# Patient Record
Sex: FEMALE | Race: WHITE | NOT HISPANIC OR LATINO | Employment: UNEMPLOYED | URBAN - METROPOLITAN AREA
[De-identification: names, ages, dates, MRNs, and addresses within clinical notes are randomized per-mention and may not be internally consistent; named-entity substitution may affect disease eponyms.]

---

## 2017-10-27 ENCOUNTER — OFFICE VISIT (OUTPATIENT)
Dept: INTERNAL MEDICINE | Facility: CLINIC | Age: 37
End: 2017-10-27

## 2017-10-27 VITALS
OXYGEN SATURATION: 95 % | RESPIRATION RATE: 16 BRPM | TEMPERATURE: 98.4 F | HEIGHT: 69 IN | HEART RATE: 81 BPM | WEIGHT: 181 LBS | BODY MASS INDEX: 26.81 KG/M2

## 2017-10-27 DIAGNOSIS — E55.9 VITAMIN D DEFICIENCY: ICD-10-CM

## 2017-10-27 DIAGNOSIS — D50.9 IRON DEFICIENCY ANEMIA, UNSPECIFIED IRON DEFICIENCY ANEMIA TYPE: ICD-10-CM

## 2017-10-27 DIAGNOSIS — Z85.850 HX OF THYROID CANCER: ICD-10-CM

## 2017-10-27 DIAGNOSIS — Z86.69 HX OF MIGRAINE HEADACHES: ICD-10-CM

## 2017-10-27 DIAGNOSIS — Z00.00 ENCOUNTER FOR PREVENTIVE HEALTH EXAMINATION: Primary | ICD-10-CM

## 2017-10-27 LAB
BASOPHILS # BLD AUTO: 0.02 10*3/MM3 (ref 0–0.2)
BASOPHILS NFR BLD AUTO: 0.4 % (ref 0–1.5)
EOSINOPHIL # BLD AUTO: 0.06 10*3/MM3 (ref 0–0.7)
EOSINOPHIL NFR BLD AUTO: 1.1 % (ref 0.3–6.2)
ERYTHROCYTE [DISTWIDTH] IN BLOOD BY AUTOMATED COUNT: 12.9 % (ref 11.7–13)
FERRITIN SERPL-MCNC: 33.69 NG/ML (ref 13–150)
HCT VFR BLD AUTO: 39.5 % (ref 35.6–45.5)
HGB BLD-MCNC: 12.7 G/DL (ref 11.9–15.5)
IMM GRANULOCYTES # BLD: 0 10*3/MM3 (ref 0–0.03)
IMM GRANULOCYTES NFR BLD: 0 % (ref 0–0.5)
IRON SATN MFR SERPL: 24 % (ref 20–50)
IRON SERPL-MCNC: 103 MCG/DL (ref 37–145)
LYMPHOCYTES # BLD AUTO: 1.04 10*3/MM3 (ref 0.9–4.8)
LYMPHOCYTES NFR BLD AUTO: 19.2 % (ref 19.6–45.3)
MCH RBC QN AUTO: 30 PG (ref 26.9–32)
MCHC RBC AUTO-ENTMCNC: 32.2 G/DL (ref 32.4–36.3)
MCV RBC AUTO: 93.4 FL (ref 80.5–98.2)
MONOCYTES # BLD AUTO: 0.35 10*3/MM3 (ref 0.2–1.2)
MONOCYTES NFR BLD AUTO: 6.4 % (ref 5–12)
NEUTROPHILS # BLD AUTO: 3.96 10*3/MM3 (ref 1.9–8.1)
NEUTROPHILS NFR BLD AUTO: 72.9 % (ref 42.7–76)
PLATELET # BLD AUTO: 283 10*3/MM3 (ref 140–500)
RBC # BLD AUTO: 4.23 10*6/MM3 (ref 3.9–5.2)
TIBC SERPL-MCNC: 427 MCG/DL
UIBC SERPL-MCNC: 324 MCG/DL
WBC # BLD AUTO: 5.43 10*3/MM3 (ref 4.5–10.7)

## 2017-10-27 PROCEDURE — 99395 PREV VISIT EST AGE 18-39: CPT | Performed by: INTERNAL MEDICINE

## 2017-10-27 PROCEDURE — 93000 ELECTROCARDIOGRAM COMPLETE: CPT | Performed by: INTERNAL MEDICINE

## 2017-10-27 RX ORDER — LEVOTHYROXINE SODIUM 0.2 MG/1
200 TABLET ORAL DAILY
COMMUNITY

## 2017-10-27 RX ORDER — LEVOTHYROXINE SODIUM 200 MCG
200 TABLET ORAL DAILY
COMMUNITY
Start: 2017-09-08 | End: 2017-10-27 | Stop reason: SDUPTHER

## 2017-10-27 RX ORDER — LEVOTHYROXINE SODIUM 0.2 MG/1
TABLET ORAL
COMMUNITY
Start: 2017-06-28 | End: 2017-10-27

## 2017-10-27 RX ORDER — ERGOCALCIFEROL 1.25 MG/1
1 CAPSULE ORAL 2 TIMES WEEKLY
COMMUNITY
Start: 2017-10-11

## 2017-10-27 NOTE — PROGRESS NOTES
Subjective   Iman London is a 36 y.o. female.     Chief Complaint   Patient presents with   • Annual Exam     new patient, chest pain, family hs of heart issues         HPI Comments: In for initial visit, transition of care, and annual preventative exam.  Sleep is poor but this is mainly related to school stress, home stress, and studying.  She is to exercise 3 days per week but not exercising now that she is in school.  Energy is poor but she thinks this is related to lack of sleep.  Diet is poor.       The following portions of the patient's history were reviewed and updated as appropriate: allergies, current medications, past social history and problem list.    HISTORY  Outpatient Prescriptions Marked as Taking for the 10/27/17 encounter (Office Visit) with Darwin Saunders MD   Medication Sig Dispense Refill   • Ferrous Sulfate ER (SLOW FE) 142 (45 Fe) MG tablet controlled-release Take 1 tablet by mouth Daily.     • levothyroxine (SYNTHROID, LEVOTHROID) 200 MCG tablet Take 200 mcg by mouth Daily. Take 1 tablet six times a week.     • vitamin D (ERGOCALCIFEROL) 38567 units capsule capsule Take 1 capsule by mouth 2 (Two) Times a Week.     • [DISCONTINUED] levothyroxine (SYNTHROID) 200 MCG tablet TAKE 1 TABLET DAILY MONDAY THROUGH FRIDAY, 1/2 TABLET ON SATURDAY ANDNO TABLET  ON SUNDAY     • [DISCONTINUED] SYNTHROID 200 MCG tablet Take 200 mcg by mouth Daily. Take 200mcg 6 times a week.       Social History     Social History   • Marital status: Other     Spouse name: N/A   • Number of children: N/A   • Years of education: N/A     Occupational History   • Not on file.     Social History Main Topics   • Smoking status: Never Smoker   • Smokeless tobacco: Never Used   • Alcohol use No   • Drug use: No   • Sexual activity: No     Other Topics Concern   • Not on file     Social History Narrative   • No narrative on file     Family History   Problem Relation Age of Onset   • Hypertension Father    • Diabetes Father     • Hyperlipidemia Father    • Diabetes Maternal Grandfather    • Melanoma Maternal Grandfather    • Pancreatic cancer Maternal Grandfather    • Hyperlipidemia Paternal Grandmother    • Heart attack Paternal Grandfather    • Hyperlipidemia Paternal Grandfather    • Hypertension Paternal Grandfather    • Alcohol abuse Maternal Uncle    • Asthma Paternal Uncle    • Diabetes Paternal Uncle    • Heart attack Paternal Uncle    • Heart failure Paternal Uncle      Past Medical History:   Diagnosis Date   • Anemia    • History of thyroid cancer      Past Surgical History:   Procedure Laterality Date   • APPENDECTOMY     • BLADDER SURGERY     •  SECTION     •  SECTION     •  SECTION     •  SECTION PRIMARY     • CHOLECYSTECTOMY     • DIAGNOSTIC LAPAROSCOPY  2001   • DILATATION AND CURETTAGE      miscariage   • THYROIDECTOMY     • TONSILLECTOMY     • WISDOM TOOTH EXTRACTION         Review of Systems   Constitutional: Negative for appetite change, chills, fatigue and fever.   HENT: Negative for congestion, ear pain, hearing loss, nosebleeds, postnasal drip, rhinorrhea, sinus pressure and trouble swallowing.    Eyes: Negative for pain, itching and visual disturbance.   Respiratory: Negative for cough, chest tightness, shortness of breath and wheezing.    Cardiovascular: Negative for chest pain, palpitations and leg swelling.   Gastrointestinal: Negative for abdominal pain, anal bleeding, constipation, diarrhea, nausea, rectal pain and vomiting.   Endocrine: Negative for cold intolerance, heat intolerance and polyuria.   Genitourinary: Negative for difficulty urinating, dysuria, flank pain, frequency, hematuria and urgency.   Musculoskeletal: Negative for arthralgias, back pain and myalgias.   Skin: Negative for rash.   Allergic/Immunologic: Negative for environmental allergies.   Neurological: Positive for headaches. Negative for dizziness, syncope,  speech difficulty, weakness, light-headedness and numbness.   Hematological: Does not bruise/bleed easily.   Psychiatric/Behavioral: Negative for agitation, confusion and sleep disturbance. The patient is nervous/anxious.        Objective   Vitals:    10/27/17 1104   Pulse: 81   Resp: 16   Temp: 98.4 °F (36.9 °C)   SpO2: 95%      Last Weight    10/27/17  1104   Weight: 181 lb (82.1 kg)    [unfilled]  Body mass index is 26.73 kg/(m^2).      Physical Exam   Constitutional: She is oriented to person, place, and time. She appears well-developed and well-nourished.   HENT:   Head: Normocephalic and atraumatic.   Right Ear: External ear normal.   Left Ear: External ear normal.   Nose: Nose normal.   Mouth/Throat: Oropharynx is clear and moist.   Eyes: Conjunctivae and EOM are normal. Pupils are equal, round, and reactive to light.   Neck: Normal range of motion. Neck supple. No JVD present. No thyromegaly present.   Cardiovascular: Normal rate, regular rhythm, normal heart sounds and intact distal pulses.  Exam reveals no gallop.    No murmur heard.  Pulmonary/Chest: Effort normal and breath sounds normal. No respiratory distress. She has no wheezes. She has no rales.   Abdominal: Soft. Bowel sounds are normal. She exhibits no distension and no mass. There is no tenderness. There is no guarding. No hernia.   Musculoskeletal: Normal range of motion. She exhibits no edema.   Lymphadenopathy:     She has no cervical adenopathy.   Neurological: She is alert and oriented to person, place, and time. She displays normal reflexes. No cranial nerve deficit. Coordination normal.   Skin: Skin is warm and dry.   Psychiatric: She has a normal mood and affect. Her behavior is normal. Judgment and thought content normal.   Nursing note and vitals reviewed.        Problem List Items Addressed This Visit        Digestive    Vitamin D deficiency       Hematopoietic and Hemostatic    Iron deficiency anemia    Relevant Medications     Ferrous Sulfate ER (SLOW FE) 142 (45 Fe) MG tablet controlled-release       Other    Hx of thyroid cancer    Hx of migraine headaches      Other Visit Diagnoses     Encounter for preventive health examination    -  Primary        Assessment/Plan   In for initial visit, transition of care, and annual preventative exam.  She has a very stressful life at present.  She's going through a divorce.  Has young children at home.  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  She has history of thyroid cancer is been resected.  She has a history of chronic iron deficiency anemia.  Only blood loss is heavy periods but she thinks this predated her heavy periods.  Mother has a history of iron deficiency anemia as well as grandmother.  She has severe microcytosis on the only labs available to me.  She has responded to iron replacement therapy in the past.  She gets about 3 headaches per week.  Throbbing headaches.  Associated nausea and vomiting.  Occasional photophobia and phonophobia.  The headaches began about 5 years ago.  There is no family history of migraines.  Also weather changes have been a precipitant or in the past.  No specific food precipitators.  She's had pretty classic scintillating scotomata in the past.  She's doing a lot of caffeine these days and that's likely a major contributor.      ECG 12 Lead  Date/Time: 10/27/2017 5:48 PM  Performed by: ROSEMARIE GALLEGOS  Authorized by: ROSEMARIE GALLEGOS   Comparison: not compared with previous ECG   Previous ECG: no previous ECG available  Rhythm: sinus rhythm  Rate: normal  Conduction: conduction normal  ST Segments: ST segments normal  T Waves: T waves normal  QRS axis: normal  Other: no other findings  Clinical impression: normal ECG                 Carl disclaimer:   Much of this encounter note is an electronic transcription/translation of spoken language to printed text. The electronic translation of spoken language may permit erroneous, or at times, nonsensical words or  phrases to be inadvertently transcribed; Although I have reviewed the note for such errors, some may still exist.

## 2017-11-29 ENCOUNTER — TELEPHONE (OUTPATIENT)
Dept: INTERNAL MEDICINE | Facility: CLINIC | Age: 37
End: 2017-11-29

## 2017-11-29 NOTE — TELEPHONE ENCOUNTER
Left message for patient to call back. 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

## 2017-12-01 ENCOUNTER — OFFICE VISIT (OUTPATIENT)
Dept: INTERNAL MEDICINE | Facility: CLINIC | Age: 37
End: 2017-12-01

## 2017-12-01 VITALS — HEIGHT: 69 IN | DIASTOLIC BLOOD PRESSURE: 62 MMHG | SYSTOLIC BLOOD PRESSURE: 116 MMHG

## 2017-12-01 DIAGNOSIS — Z01.30 BLOOD PRESSURE CHECK: Primary | ICD-10-CM

## 2018-02-19 ENCOUNTER — OFFICE VISIT (OUTPATIENT)
Dept: INTERNAL MEDICINE | Facility: CLINIC | Age: 38
End: 2018-02-19

## 2018-02-19 VITALS
HEIGHT: 69 IN | DIASTOLIC BLOOD PRESSURE: 72 MMHG | BODY MASS INDEX: 27.34 KG/M2 | TEMPERATURE: 98 F | WEIGHT: 184.6 LBS | SYSTOLIC BLOOD PRESSURE: 110 MMHG | RESPIRATION RATE: 16 BRPM | HEART RATE: 80 BPM | OXYGEN SATURATION: 98 %

## 2018-02-19 DIAGNOSIS — Z86.69 HX OF MIGRAINE HEADACHES: Primary | ICD-10-CM

## 2018-02-19 PROCEDURE — 99213 OFFICE O/P EST LOW 20 MIN: CPT | Performed by: INTERNAL MEDICINE

## 2018-02-19 RX ORDER — RIZATRIPTAN BENZOATE 5 MG/1
5 TABLET ORAL ONCE AS NEEDED
Qty: 6 TABLET | Refills: 1 | Status: SHIPPED | OUTPATIENT
Start: 2018-02-19

## 2018-02-19 RX ORDER — TOPIRAMATE 25 MG/1
25 TABLET ORAL NIGHTLY
Qty: 30 TABLET | Refills: 2 | Status: SHIPPED | OUTPATIENT
Start: 2018-02-19 | End: 2018-03-07 | Stop reason: SDUPTHER

## 2018-02-19 RX ORDER — TOPIRAMATE 25 MG/1
25 TABLET ORAL DAILY
Qty: 30 TABLET | Refills: 2 | Status: SHIPPED | OUTPATIENT
Start: 2018-02-19 | End: 2018-02-19

## 2018-02-19 NOTE — PROGRESS NOTES
Subjective   Iman London is a 37 y.o. female.     Chief Complaint   Patient presents with   • Headache     Limited caffeine use & still experiences headaches, worse w rain         Headache    This is a chronic problem. The current episode started more than 1 year ago. The problem occurs intermittently. The problem has been unchanged. The pain is located in the bilateral and frontal region. The pain quality is similar to prior headaches. The quality of the pain is described as throbbing. The pain is severe. Pertinent negatives include no dizziness, fever or weakness. The symptoms are aggravated by weather changes and menstrual cycle. She has tried nothing for the symptoms. Her past medical history is significant for migraine headaches and migraines in the family.        The following portions of the patient's history were reviewed and updated as appropriate: allergies, current medications, past social history and problem list.    Outpatient Prescriptions Marked as Taking for the 2/19/18 encounter (Office Visit) with Darwin Saunders MD   Medication Sig Dispense Refill   • Ferrous Sulfate ER (SLOW FE) 142 (45 Fe) MG tablet controlled-release Take 1 tablet by mouth Daily.     • levothyroxine (SYNTHROID, LEVOTHROID) 200 MCG tablet Take 200 mcg by mouth Daily. Take 1 tablet six times a week.     • vitamin D (ERGOCALCIFEROL) 08806 units capsule capsule Take 1 capsule by mouth 2 (Two) Times a Week.         Review of Systems   Constitutional: Negative for chills and fever.   Musculoskeletal: Negative for arthralgias.   Neurological: Positive for headaches. Negative for dizziness, syncope and weakness.       Objective   Vitals:    02/19/18 1118   BP: 110/72   Pulse: 80   Resp: 16   Temp: 98 °F (36.7 °C)   SpO2: 98%      Last Weight    02/19/18  1118   Weight: 83.7 kg (184 lb 9.6 oz)    [unfilled]  Body mass index is 27.25 kg/(m^2).      Physical Exam   Constitutional: She appears well-developed and well-nourished.    HENT:   Head: Normocephalic and atraumatic.   Right Ear: External ear normal.   Left Ear: External ear normal.   Nose: Nose normal.   Mouth/Throat: Oropharynx is clear and moist.   Eyes: Conjunctivae are normal. Pupils are equal, round, and reactive to light.   Pulmonary/Chest: Effort normal and breath sounds normal. No respiratory distress. She has no wheezes. She has no rales.   Skin: Skin is warm and dry.         Problem List Items Addressed This Visit        Other    Hx of migraine headaches - Primary        Assessment/Plan   In today with worsening migraines.  She cut out caffeine is that she could.  Just has 1 cup of coffee in the morning.  Nonetheless her migraines have been increasing lately.  Especially with the weather change.  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  We discussed some food interdiction's.  In on Topamax 25 mg at bedtime.  Begin Maxalt 5 mg when necessary.  #6.  We'll recheck in a few months and see how she's doing with her headaches.         Dragon disclaimer:   Much of this encounter note is an electronic transcription/translation of spoken language to printed text. The electronic translation of spoken language may permit erroneous, or at times, nonsensical words or phrases to be inadvertently transcribed; Although I have reviewed the note for such errors, some may still exist.

## 2018-02-26 ENCOUNTER — TELEPHONE (OUTPATIENT)
Dept: INTERNAL MEDICINE | Facility: CLINIC | Age: 38
End: 2018-02-26

## 2018-03-07 RX ORDER — TOPIRAMATE 25 MG/1
TABLET ORAL
Qty: 90 TABLET | Refills: 2 | Status: SHIPPED | OUTPATIENT
Start: 2018-03-07 | End: 2018-05-29 | Stop reason: SDUPTHER

## 2018-04-05 ENCOUNTER — TELEPHONE (OUTPATIENT)
Dept: INTERNAL MEDICINE | Facility: CLINIC | Age: 38
End: 2018-04-05

## 2018-04-05 NOTE — TELEPHONE ENCOUNTER
The patient is currently taking 75 mg of topamax daily. She has been taking this for about 3-4 weeks and said it worked great when she first started. Over the last week, it has seemed less effective. Patient is currently on her menstrual cycle and wondered if the hormonal changes could affect how the medication works? Should she adjust her dose? Please advise. Thanks!    In general migraines are worse around menses time so it may be her normal worsening.  I would give it a cycle or 2 and see if she is happy with her global control.  If not would boost up to 100 mg daily.

## 2018-04-26 ENCOUNTER — OFFICE VISIT (OUTPATIENT)
Dept: INTERNAL MEDICINE | Facility: CLINIC | Age: 38
End: 2018-04-26

## 2018-04-26 VITALS
OXYGEN SATURATION: 97 % | WEIGHT: 173.4 LBS | HEART RATE: 90 BPM | DIASTOLIC BLOOD PRESSURE: 76 MMHG | BODY MASS INDEX: 25.68 KG/M2 | RESPIRATION RATE: 16 BRPM | HEIGHT: 69 IN | TEMPERATURE: 98.4 F | SYSTOLIC BLOOD PRESSURE: 126 MMHG

## 2018-04-26 DIAGNOSIS — Z86.69 HX OF MIGRAINE HEADACHES: Primary | ICD-10-CM

## 2018-04-26 PROCEDURE — 99213 OFFICE O/P EST LOW 20 MIN: CPT | Performed by: INTERNAL MEDICINE

## 2018-04-26 NOTE — PROGRESS NOTES
Subjective   Iman London is a 37 y.o. female.     Chief Complaint   Patient presents with   • Migraine         Migraine    This is a chronic problem. The current episode started more than 1 year ago. The problem has been gradually improving. The pain is located in the bilateral region. The pain quality is similar to prior headaches. The quality of the pain is described as throbbing. The pain is moderate. Pertinent negatives include no dizziness, fever or weakness. She has tried NSAIDs for the symptoms. The treatment provided moderate relief.        The following portions of the patient's history were reviewed and updated as appropriate: allergies, current medications, past social history and problem list.    Outpatient Prescriptions Marked as Taking for the 4/26/18 encounter (Office Visit) with Darwin Saunders MD   Medication Sig Dispense Refill   • Ferrous Sulfate ER (SLOW FE) 142 (45 Fe) MG tablet controlled-release Take 1 tablet by mouth Daily.     • levothyroxine (SYNTHROID, LEVOTHROID) 200 MCG tablet Take 200 mcg by mouth Daily. Take 1 tablet six times a week.     • rizatriptan (MAXALT) 5 MG tablet Take 1 tablet by mouth 1 (One) Time As Needed for Migraine for up to 1 dose. May repeat in 2 hours if needed 6 tablet 1   • topiramate (TOPAMAX) 25 MG tablet Take 3 tablets nightly 90 tablet 2   • vitamin D (ERGOCALCIFEROL) 18021 units capsule capsule Take 1 capsule by mouth 2 (Two) Times a Week.         Review of Systems   Constitutional: Negative for chills and fever.   Musculoskeletal: Negative for arthralgias.   Neurological: Positive for headaches. Negative for dizziness, syncope and weakness.       Objective   Vitals:    04/26/18 1011   BP: 126/76   Pulse: 90   Resp: 16   Temp: 98.4 °F (36.9 °C)   SpO2: 97%      1    04/26/18  1011   Weight: 78.7 kg (173 lb 6.4 oz)    [unfilled]  Body mass index is 25.59 kg/m².      Physical Exam   Constitutional: She is oriented to person, place, and time. She appears  well-developed and well-nourished.   Neurological: She is alert and oriented to person, place, and time. She has normal reflexes. She displays normal reflexes. No cranial nerve deficit. Coordination normal.         Problem List Items Addressed This Visit        Other    Hx of migraine headaches - Primary      Other Visit Diagnoses    None.       Assessment/Plan   In today with worsening migraines.  On Topamax 75 mg at bedtime now.  It took a week or 2 to get used to it and that was rough but after that headaches firstly resolved.  She had a very nice response to therapy until finals came along and now headaches or exacerbating.  4-5 headaches in the last week or 2.  I suspect this is related to stress as well as change in sleep patterns.  Advised patient to keep sleep patterns is regular she can.  Current headaches seem to be around menses time.  She is taking some occasional Advil which helps the acute headaches.         Dragon disclaimer:   Much of this encounter note is an electronic transcription/translation of spoken language to printed text. The electronic translation of spoken language may permit erroneous, or at times, nonsensical words or phrases to be inadvertently transcribed; Although I have reviewed the note for such errors, some may still exist.

## 2018-05-09 ENCOUNTER — TELEPHONE (OUTPATIENT)
Dept: INTERNAL MEDICINE | Facility: CLINIC | Age: 38
End: 2018-05-09

## 2018-05-09 DIAGNOSIS — Z11.1 TUBERCULOSIS SCREENING: Primary | ICD-10-CM

## 2018-05-09 NOTE — TELEPHONE ENCOUNTER
1. Patient is wanting to know if she can up her dose of Topamax. She is current taking 75 mg at bedtime. Patient wakes up with a headache almost every morning and has had 3-4 migraines this week alone.      2. 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 I advised patient to go to the health department. Patient requested a written order from you for [facility] (she has done this in the past with another provider). Is this something you would order for her?     Please advise on both questions.     Yes.  Increase Topamax to 100 mg at bedtime.  Also yes, there is a pleasant test for TB screening.  It is either the T-spot or the quantiferon gold depending on the lab.  I would try to T spot with [employer].

## 2018-05-10 NOTE — TELEPHONE ENCOUNTER
Informed patient about increasing her Topamax. [facility] does not draw the Tspot. Ordered the Quantiferon gold. Patient will go to a [facility] draw station.

## 2018-05-17 LAB
ANNOTATION COMMENT IMP: NORMAL
GAMMA INTERFERON BACKGROUND BLD IA-ACNC: 0.05 IU/ML
M TB IFN-G BLD-IMP: NEGATIVE
M TB IFN-G CD4+ BCKGRND COR BLD-ACNC: 0 IU/ML
M TB IFN-G CD4+ T-CELLS BLD-ACNC: 0.05 IU/ML
MITOGEN IGNF BLD-ACNC: >10 IU/ML
QUANTIFERON INCUBATION: NORMAL
SERVICE CMNT-IMP: NORMAL

## 2018-05-29 RX ORDER — TOPIRAMATE 25 MG/1
TABLET ORAL
Qty: 90 TABLET | Refills: 2 | Status: SHIPPED | OUTPATIENT
Start: 2018-05-29 | End: 2018-05-30 | Stop reason: SDUPTHER

## 2018-05-29 NOTE — TELEPHONE ENCOUNTER
Medication, Strength, & Sig: Topamax 100 mg nightly  Last O.V.: 4/26/18  Next O.V.: 7/26/18  Verify Pharmacy: [facility] [place], [place]    *Dose increase (Ref: Telephone encounter 5/9/18) & pharmacy change.

## 2018-05-30 RX ORDER — TOPIRAMATE 25 MG/1
TABLET ORAL
Qty: 120 TABLET | Refills: 2 | Status: SHIPPED | OUTPATIENT
Start: 2018-05-30 | End: 2018-08-27 | Stop reason: SDUPTHER

## 2018-08-27 RX ORDER — TOPIRAMATE 25 MG/1
TABLET ORAL
Qty: 120 TABLET | Refills: 1 | Status: SHIPPED | OUTPATIENT
Start: 2018-08-27 | End: 2018-08-28 | Stop reason: SDUPTHER

## 2018-08-28 ENCOUNTER — OFFICE VISIT (OUTPATIENT)
Dept: INTERNAL MEDICINE | Facility: CLINIC | Age: 38
End: 2018-08-28

## 2018-08-28 VITALS
SYSTOLIC BLOOD PRESSURE: 116 MMHG | RESPIRATION RATE: 16 BRPM | HEART RATE: 84 BPM | OXYGEN SATURATION: 100 % | HEIGHT: 69 IN | WEIGHT: 155.6 LBS | BODY MASS INDEX: 23.05 KG/M2 | DIASTOLIC BLOOD PRESSURE: 64 MMHG

## 2018-08-28 DIAGNOSIS — Z86.69 HX OF MIGRAINE HEADACHES: Primary | ICD-10-CM

## 2018-08-28 DIAGNOSIS — D50.9 IRON DEFICIENCY ANEMIA, UNSPECIFIED IRON DEFICIENCY ANEMIA TYPE: ICD-10-CM

## 2018-08-28 PROCEDURE — 99213 OFFICE O/P EST LOW 20 MIN: CPT | Performed by: INTERNAL MEDICINE

## 2018-08-28 RX ORDER — TOPIRAMATE 100 MG/1
100 TABLET, FILM COATED ORAL NIGHTLY
Qty: 90 TABLET | Refills: 1 | Status: SHIPPED | OUTPATIENT
Start: 2018-08-28 | End: 2019-02-21 | Stop reason: SDUPTHER

## 2018-08-28 NOTE — PROGRESS NOTES
Subjective   Iman London is a 37 y.o. female.     Chief Complaint   Patient presents with   • Migraine     follow up         Mild iron deficiency anemia being followed on iron replacement.      Migraine    This is a chronic problem. The current episode started more than 1 year ago. The problem has been gradually improving. The pain is located in the bilateral region. The pain quality is similar to prior headaches. The quality of the pain is described as throbbing. The pain is moderate. Pertinent negatives include no dizziness, fever or weakness. She has tried NSAIDs for the symptoms. The treatment provided moderate relief.        The following portions of the patient's history were reviewed and updated as appropriate: allergies, current medications, past social history and problem list.    Outpatient Prescriptions Marked as Taking for the 8/28/18 encounter (Office Visit) with Darwin Saunders MD   Medication Sig Dispense Refill   • levothyroxine (SYNTHROID, LEVOTHROID) 200 MCG tablet Take 200 mcg by mouth Daily. Take 1 tablet six times a week.     • topiramate (TOPAMAX) 100 MG tablet Take 1 tablet by mouth Every Night. 90 tablet 1   • vitamin D (ERGOCALCIFEROL) 46995 units capsule capsule Take 1 capsule by mouth 2 (Two) Times a Week.     • [DISCONTINUED] topiramate (TOPAMAX) 25 MG tablet TAKE 4 TABLETS BY MOUTH EVERY DAY AT BEDTIME 120 tablet 1       Review of Systems   Constitutional: Negative for chills and fever.   Musculoskeletal: Negative for arthralgias.   Neurological: Positive for headaches. Negative for dizziness, syncope and weakness.       Objective   Vitals:    08/28/18 1357   BP: 116/64   Pulse: 84   Resp: 16   SpO2: 100%      1    08/28/18  1357   Weight: 70.6 kg (155 lb 9.6 oz)    [unfilled]  Body mass index is 22.97 kg/m².      Physical Exam   Constitutional: She is oriented to person, place, and time. She appears well-developed and well-nourished.   Neurological: She is alert and oriented to  person, place, and time. She has normal reflexes. She displays normal reflexes. No cranial nerve deficit. Coordination normal.         Problem List Items Addressed This Visit        Hematopoietic and Hemostatic    Iron deficiency anemia       Other    Hx of migraine headaches - Primary        Assessment/Plan   In today with worsening migraines.  On Topamax 75 mg at bedtime now.  It took a week or 2 to get used to it and that was rough but after that headaches firstly resolved.  She had a very nice response to therapy .  .  Few headaches over the summer.  Had a series of a few headaches around the start of school and that is now smoothed out.  I suspect this is related to stress as well as change in sleep patterns.  Advised patient to keep sleep patterns is regular she can.  Current headaches seem to be around menses time.  She is taking some occasional Advil which helps the acute headaches.  Will move out to 6 months on office visits at this point.  Her chronic iron deficiency is being treated with replacement therapy.  She's refused uterine ablations in the past.         Carl disclaimer:   Much of this encounter note is an electronic transcription/translation of spoken language to printed text. The electronic translation of spoken language may permit erroneous, or at times, nonsensical words or phrases to be inadvertently transcribed; Although I have reviewed the note for such errors, some may still exist.

## 2018-10-29 ENCOUNTER — OFFICE VISIT (OUTPATIENT)
Dept: INTERNAL MEDICINE | Facility: CLINIC | Age: 38
End: 2018-10-29

## 2018-10-29 VITALS
OXYGEN SATURATION: 99 % | WEIGHT: 151 LBS | RESPIRATION RATE: 16 BRPM | BODY MASS INDEX: 22.36 KG/M2 | SYSTOLIC BLOOD PRESSURE: 102 MMHG | HEART RATE: 64 BPM | HEIGHT: 69 IN | TEMPERATURE: 98 F | DIASTOLIC BLOOD PRESSURE: 60 MMHG

## 2018-10-29 DIAGNOSIS — D50.9 IRON DEFICIENCY ANEMIA, UNSPECIFIED IRON DEFICIENCY ANEMIA TYPE: ICD-10-CM

## 2018-10-29 DIAGNOSIS — Z85.850 HX OF THYROID CANCER: ICD-10-CM

## 2018-10-29 DIAGNOSIS — E55.9 VITAMIN D DEFICIENCY: ICD-10-CM

## 2018-10-29 DIAGNOSIS — Z00.00 ENCOUNTER FOR HEALTH MAINTENANCE EXAMINATION: Primary | ICD-10-CM

## 2018-10-29 LAB
25(OH)D3+25(OH)D2 SERPL-MCNC: 46.9 NG/ML (ref 30–100)
ALBUMIN SERPL-MCNC: 4.5 G/DL (ref 3.5–5.2)
ALBUMIN/GLOB SERPL: 1.7 G/DL
ALP SERPL-CCNC: 67 U/L (ref 39–117)
ALT SERPL-CCNC: 9 U/L (ref 1–33)
AST SERPL-CCNC: 11 U/L (ref 1–32)
BASOPHILS # BLD AUTO: 0.03 10*3/MM3 (ref 0–0.2)
BASOPHILS NFR BLD AUTO: 0.5 % (ref 0–1.5)
BILIRUB BLD-MCNC: NEGATIVE MG/DL
BILIRUB SERPL-MCNC: 0.3 MG/DL (ref 0.1–1.2)
BUN SERPL-MCNC: 8 MG/DL (ref 6–20)
BUN/CREAT SERPL: 9 (ref 7–25)
CALCIUM SERPL-MCNC: 9.3 MG/DL (ref 8.6–10.5)
CHLORIDE SERPL-SCNC: 108 MMOL/L (ref 98–107)
CHOLEST SERPL-MCNC: 138 MG/DL (ref 0–200)
CLARITY, POC: CLEAR
CO2 SERPL-SCNC: 21 MMOL/L (ref 22–29)
COLOR UR: YELLOW
CREAT SERPL-MCNC: 0.89 MG/DL (ref 0.57–1)
EOSINOPHIL # BLD AUTO: 0.06 10*3/MM3 (ref 0–0.7)
EOSINOPHIL NFR BLD AUTO: 1 % (ref 0.3–6.2)
ERYTHROCYTE [DISTWIDTH] IN BLOOD BY AUTOMATED COUNT: 14.6 % (ref 11.7–13)
GLOBULIN SER CALC-MCNC: 2.7 GM/DL
GLUCOSE SERPL-MCNC: 82 MG/DL (ref 65–99)
GLUCOSE UR STRIP-MCNC: NEGATIVE MG/DL
HCT VFR BLD AUTO: 33.6 % (ref 35.6–45.5)
HDLC SERPL-MCNC: 47 MG/DL (ref 40–60)
HGB BLD-MCNC: 10 G/DL (ref 11.9–15.5)
IMM GRANULOCYTES # BLD: 0.01 10*3/MM3 (ref 0–0.03)
IMM GRANULOCYTES NFR BLD: 0.2 % (ref 0–0.5)
KETONES UR QL: NEGATIVE
LDLC SERPL CALC-MCNC: 72 MG/DL (ref 0–100)
LEUKOCYTE EST, POC: NEGATIVE
LYMPHOCYTES # BLD AUTO: 1.44 10*3/MM3 (ref 0.9–4.8)
LYMPHOCYTES NFR BLD AUTO: 25.2 % (ref 19.6–45.3)
MCH RBC QN AUTO: 25.4 PG (ref 26.9–32)
MCHC RBC AUTO-ENTMCNC: 29.8 G/DL (ref 32.4–36.3)
MCV RBC AUTO: 85.3 FL (ref 80.5–98.2)
MONOCYTES # BLD AUTO: 0.29 10*3/MM3 (ref 0.2–1.2)
MONOCYTES NFR BLD AUTO: 5.1 % (ref 5–12)
NEUTROPHILS # BLD AUTO: 3.9 10*3/MM3 (ref 1.9–8.1)
NEUTROPHILS NFR BLD AUTO: 68.2 % (ref 42.7–76)
NITRITE UR-MCNC: NEGATIVE MG/ML
PH UR: 6 [PH] (ref 5–8)
PLATELET # BLD AUTO: 290 10*3/MM3 (ref 140–500)
POTASSIUM SERPL-SCNC: 4 MMOL/L (ref 3.5–5.2)
PROT SERPL-MCNC: 7.2 G/DL (ref 6–8.5)
PROT UR STRIP-MCNC: NEGATIVE MG/DL
RBC # BLD AUTO: 3.94 10*6/MM3 (ref 3.9–5.2)
RBC # UR STRIP: NEGATIVE /UL
SODIUM SERPL-SCNC: 141 MMOL/L (ref 136–145)
SP GR UR: 1.01 (ref 1–1.03)
TRIGL SERPL-MCNC: 93 MG/DL (ref 0–150)
TSH SERPL DL<=0.005 MIU/L-ACNC: 0.04 MIU/ML (ref 0.27–4.2)
UROBILINOGEN UR QL: NORMAL
VLDLC SERPL CALC-MCNC: 18.6 MG/DL (ref 5–40)
WBC # BLD AUTO: 5.72 10*3/MM3 (ref 4.5–10.7)

## 2018-10-29 PROCEDURE — 99395 PREV VISIT EST AGE 18-39: CPT | Performed by: INTERNAL MEDICINE

## 2018-10-29 PROCEDURE — 81003 URINALYSIS AUTO W/O SCOPE: CPT | Performed by: INTERNAL MEDICINE

## 2018-10-29 RX ORDER — CLOBETASOL PROPIONATE 0.5 MG/G
AEROSOL, FOAM TOPICAL DAILY PRN
Qty: 100 G | Refills: 3 | Status: SHIPPED | OUTPATIENT
Start: 2018-10-29

## 2018-10-29 NOTE — PROGRESS NOTES
Subjective   Iman London is a 37 y.o. female.     No chief complaint on file.        In for annual preventative exam.  Sleep is poor but this is mainly related to school stress, home stress, and studying.  Gets about 6 hours per night on average.  She is to exercise 2-3 days per week.  Energy is pretty good.  Diet is good.         The following portions of the patient's history were reviewed and updated as appropriate: allergies, current medications, past social history and problem list.    HISTORY  Outpatient Prescriptions Marked as Taking for the 10/29/18 encounter (Office Visit) with Darwin Saunders MD   Medication Sig Dispense Refill   • Ferrous Sulfate ER (SLOW FE) 142 (45 Fe) MG tablet controlled-release Take 1 tablet by mouth Daily.     • levothyroxine (SYNTHROID, LEVOTHROID) 200 MCG tablet Take 200 mcg by mouth Daily. Take 1 tablet six times a week.     • rizatriptan (MAXALT) 5 MG tablet Take 1 tablet by mouth 1 (One) Time As Needed for Migraine for up to 1 dose. May repeat in 2 hours if needed 6 tablet 1   • topiramate (TOPAMAX) 100 MG tablet Take 1 tablet by mouth Every Night. 90 tablet 1   • vitamin D (ERGOCALCIFEROL) 85102 units capsule capsule Take 1 capsule by mouth 2 (Two) Times a Week.       Social History     Social History   • Marital status: Other     Spouse name: N/A   • Number of children: N/A   • Years of education: N/A     Occupational History   • Not on file.     Social History Main Topics   • Smoking status: Never Smoker   • Smokeless tobacco: Never Used   • Alcohol use No   • Drug use: No   • Sexual activity: No     Other Topics Concern   • Not on file     Social History Narrative   • No narrative on file     Family History   Problem Relation Age of Onset   • Hypertension Father    • Diabetes Father    • Hyperlipidemia Father    • Diabetes Maternal Grandfather    • Melanoma Maternal Grandfather    • Pancreatic cancer Maternal Grandfather    • Hyperlipidemia Paternal Grandmother    •  Heart attack Paternal Grandfather    • Hyperlipidemia Paternal Grandfather    • Hypertension Paternal Grandfather    • Alcohol abuse Maternal Uncle    • Asthma Paternal Uncle    • Diabetes Paternal Uncle    • Heart attack Paternal Uncle    • Heart failure Paternal Uncle      Past Medical History:   Diagnosis Date   • Anemia    • History of thyroid cancer      Past Surgical History:   Procedure Laterality Date   • APPENDECTOMY     • BLADDER SURGERY     •  SECTION     •  SECTION     •  SECTION     •  SECTION PRIMARY  2004   • CHOLECYSTECTOMY     • DIAGNOSTIC LAPAROSCOPY  2001   • DILATATION AND CURETTAGE      miscariage   • THYROIDECTOMY     • TONSILLECTOMY     • WISDOM TOOTH EXTRACTION         Review of Systems   Constitutional: Negative for appetite change, chills, fatigue and fever.   HENT: Negative for congestion, ear pain, hearing loss, nosebleeds, postnasal drip, rhinorrhea, sinus pressure and trouble swallowing.    Eyes: Negative for pain, itching and visual disturbance.   Respiratory: Negative for cough, chest tightness, shortness of breath and wheezing.    Cardiovascular: Negative for chest pain, palpitations and leg swelling.   Gastrointestinal: Negative for abdominal pain, anal bleeding, constipation, diarrhea, nausea, rectal pain and vomiting.   Endocrine: Negative for cold intolerance, heat intolerance and polyuria.   Genitourinary: Negative for difficulty urinating, dysuria, flank pain, frequency, hematuria and urgency.   Musculoskeletal: Negative for arthralgias, back pain and myalgias.   Skin: Positive for rash.   Allergic/Immunologic: Positive for environmental allergies.   Neurological: Positive for headaches. Negative for dizziness, syncope, speech difficulty, weakness, light-headedness and numbness.   Hematological: Does not bruise/bleed easily.   Psychiatric/Behavioral: Negative for agitation, confusion and sleep  disturbance. The patient is not nervous/anxious.        Objective   Vitals:    10/29/18 1341   BP: 102/60   Pulse: 64   Resp: 16   Temp: 98 °F (36.7 °C)   SpO2: 99%      1    10/29/18  1341   Weight: 68.5 kg (151 lb)    [unfilled]  Body mass index is 22.29 kg/m².      Physical Exam   Constitutional: She is oriented to person, place, and time. She appears well-developed and well-nourished.   HENT:   Head: Normocephalic and atraumatic.   Right Ear: External ear normal.   Left Ear: External ear normal.   Nose: Nose normal.   Mouth/Throat: Oropharynx is clear and moist.   Eyes: Pupils are equal, round, and reactive to light. Conjunctivae and EOM are normal.   Neck: Normal range of motion. Neck supple. No JVD present. No thyromegaly present.   Cardiovascular: Normal rate, regular rhythm, normal heart sounds and intact distal pulses.  Exam reveals no gallop.    No murmur heard.  Pulmonary/Chest: Effort normal and breath sounds normal. No respiratory distress. She has no wheezes. She has no rales.   Abdominal: Soft. Bowel sounds are normal. She exhibits no distension and no mass. There is no tenderness. There is no guarding. No hernia.   Musculoskeletal: Normal range of motion. She exhibits no edema.   Lymphadenopathy:     She has no cervical adenopathy.   Neurological: She is alert and oriented to person, place, and time. She displays normal reflexes. No cranial nerve deficit. Coordination normal.   Skin: Skin is warm and dry.   Psychiatric: She has a normal mood and affect. Her behavior is normal. Judgment and thought content normal.   Nursing note and vitals reviewed.        Problem List Items Addressed This Visit        Digestive    Vitamin D deficiency    Relevant Orders    Vitamin D 25 Hydroxy       Hematopoietic and Hemostatic    Iron deficiency anemia       Other    Hx of thyroid cancer    Relevant Orders    TSH      Other Visit Diagnoses     Encounter for health maintenance examination    -  Primary     Relevant Orders    POCT urinalysis dipstick, automated (Completed)    CBC & Differential    Comprehensive Metabolic Panel    Lipid Panel        Assessment/Plan   In for annual preventative exam.  She has a very stressful life at present but much improved since school is almost over.  She's going through a divorce.  Has young children at home.  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  She has history of thyroid cancer is been resected.  She has a history of chronic iron deficiency anemia.  Only blood loss is heavy periods but she thinks this predated her heavy periods.  Mother has a history of iron deficiency anemia as well as grandmother.  She has severe microcytosis on the only labs available to me.  She has responded to iron replacement therapy in the past.  Eczema seems be flaring.  We'll give her a refill on her clobetasol.         Dragon disclaimer:   Much of this encounter note is an electronic transcription/translation of spoken language to printed text. The electronic translation of spoken language may permit erroneous, or at times, nonsensical words or phrases to be inadvertently transcribed; Although I have reviewed the note for such errors, some may still exist.

## 2019-02-15 ENCOUNTER — OFFICE VISIT (OUTPATIENT)
Dept: INTERNAL MEDICINE | Facility: CLINIC | Age: 39
End: 2019-02-15

## 2019-02-15 VITALS
HEART RATE: 72 BPM | DIASTOLIC BLOOD PRESSURE: 86 MMHG | SYSTOLIC BLOOD PRESSURE: 126 MMHG | TEMPERATURE: 98.1 F | RESPIRATION RATE: 16 BRPM | OXYGEN SATURATION: 93 % | HEIGHT: 69 IN | BODY MASS INDEX: 22.29 KG/M2

## 2019-02-15 DIAGNOSIS — L50.9 HIVES: Primary | ICD-10-CM

## 2019-02-15 DIAGNOSIS — J06.9 VIRAL UPPER RESPIRATORY TRACT INFECTION: ICD-10-CM

## 2019-02-15 PROCEDURE — 99213 OFFICE O/P EST LOW 20 MIN: CPT | Performed by: INTERNAL MEDICINE

## 2019-02-15 RX ORDER — RANITIDINE 150 MG/1
150 CAPSULE ORAL 2 TIMES DAILY
Qty: 60 CAPSULE | Refills: 11 | Status: SHIPPED | OUTPATIENT
Start: 2019-02-15 | End: 2020-02-15

## 2019-02-15 RX ORDER — METHYLPREDNISOLONE 4 MG/1
TABLET ORAL
Qty: 1 EACH | Refills: 0 | Status: SHIPPED | OUTPATIENT
Start: 2019-02-15

## 2019-02-15 NOTE — PROGRESS NOTES
Subjective   Iman London is a 38 y.o. female.     Chief Complaint   Patient presents with   • Urticaria     off & on 1/1/19   • Sore Throat   • Cough         Urticaria   This is a recurrent problem. The current episode started more than 1 month ago. The problem has been waxing and waning since onset. The affected locations include the right arm. The rash is characterized by redness and swelling. She was exposed to nothing. Associated symptoms include coughing. Pertinent negatives include no diarrhea, fever, rhinorrhea, shortness of breath or vomiting. Past treatments include antihistamine. The treatment provided moderate relief.   Cough   This is a new problem. The current episode started 1 to 4 weeks ago. The problem has been waxing and waning. The cough is non-productive. Pertinent negatives include no chills, fever, nasal congestion, postnasal drip, rhinorrhea or shortness of breath. The treatment provided no relief.        The following portions of the patient's history were reviewed and updated as appropriate: allergies, current medications, past social history and problem list.    No outpatient medications have been marked as taking for the 2/15/19 encounter (Office Visit) with Darwin Saunders MD.       Review of Systems   Constitutional: Negative for chills and fever.   HENT: Negative for postnasal drip and rhinorrhea.    Respiratory: Positive for cough. Negative for shortness of breath.    Gastrointestinal: Negative for constipation, diarrhea, nausea and vomiting.       Objective   Vitals:    02/15/19 1133   BP: 126/86   Pulse: 72   Resp: 16   Temp: 98.1 °F (36.7 °C)   SpO2: 93%    There were no vitals filed for this visit. [unfilled]  Body mass index is 22.29 kg/m².      Physical Exam   Constitutional: She appears well-developed and well-nourished.   HENT:   Head: Normocephalic and atraumatic.   Right Ear: External ear normal.   Left Ear: External ear normal.   Nose: Nose normal.   Mouth/Throat:  Oropharynx is clear and moist.   Eyes: Conjunctivae are normal. Pupils are equal, round, and reactive to light.   Pulmonary/Chest: Effort normal and breath sounds normal. No respiratory distress. She has no wheezes. She has no rales.   Skin: Skin is warm and dry. Rash (hives right arm) noted.         Problem List Items Addressed This Visit        Musculoskeletal and Integument    Hives - Primary      Other Visit Diagnoses     Viral upper respiratory tract infection            Assessment/Plan   In with 2 problems today.  She has been having hives on her right arm for about 6 weeks.  She had hives going back about 2 years ago.  Saw an allergist.  No diagnosis was made.  She wonders if it was certain sauces like [place] [employer] sauce.  Perhaps a preservative.  Nonetheless she treats with Zyrtec as needed.  Incomplete relief.  Advised to take Zyrtec daily.  To take Zantac 150 twice daily with it.  She is also had a cold for about 2 weeks on and off.  Running through the family.  Cough and postnasal drip.  We will give her a Medrol Dosepak today.  That might help the hives as well as the sinus symptoms.           .[email or web address]  Dragon disclaimer:   Much of this encounter note is an electronic transcription/translation of spoken language to printed text. The electronic translation of spoken language may permit erroneous, or at times, nonsensical words or phrases to be inadvertently transcribed; Although I have reviewed the note for such errors, some may still exist.

## 2019-02-21 RX ORDER — TOPIRAMATE 100 MG/1
TABLET, FILM COATED ORAL
Qty: 90 TABLET | Refills: 1 | Status: SHIPPED | OUTPATIENT
Start: 2019-02-21 | End: 2019-08-22 | Stop reason: SDUPTHER

## 2019-03-01 ENCOUNTER — TELEPHONE (OUTPATIENT)
Dept: INTERNAL MEDICINE | Facility: CLINIC | Age: 39
End: 2019-03-01

## 2019-03-01 NOTE — TELEPHONE ENCOUNTER
Patient was in a couple weeks ago with hives on Right arm and hands.  Patient finished steroid pack, hives are back on (L) hand, and (R) arm and hand.  She is guessing this is stress.  The only thing she has eaten in the last 12 hours is a granola bar.  Is there anything you can prescribe to help her with stress.  Please advise.       Let us begin patient on Zantac 50 mg once daily #30.  Have her follow-up with me in 1 month.

## 2019-08-22 RX ORDER — TOPIRAMATE 100 MG/1
TABLET, FILM COATED ORAL
Qty: 90 TABLET | Refills: 1 | Status: SHIPPED | OUTPATIENT
Start: 2019-08-22

## 2019-10-30 ENCOUNTER — TELEPHONE (OUTPATIENT)
Dept: INTERNAL MEDICINE | Facility: CLINIC | Age: 39
End: 2019-10-30

## 2020-02-17 RX ORDER — TOPIRAMATE 100 MG/1
TABLET, FILM COATED ORAL
Qty: 90 TABLET | Refills: 1 | OUTPATIENT
Start: 2020-02-17